# Patient Record
Sex: MALE | Race: BLACK OR AFRICAN AMERICAN
[De-identification: names, ages, dates, MRNs, and addresses within clinical notes are randomized per-mention and may not be internally consistent; named-entity substitution may affect disease eponyms.]

---

## 2018-03-31 ENCOUNTER — HOSPITAL ENCOUNTER (EMERGENCY)
Dept: HOSPITAL 63 - ER | Age: 28
Discharge: PSYCHIATRIC OR SUBSTANCE USE FACILITY | End: 2018-03-31
Payer: OTHER GOVERNMENT

## 2018-03-31 VITALS — HEIGHT: 73 IN | WEIGHT: 250 LBS | BODY MASS INDEX: 33.13 KG/M2

## 2018-03-31 VITALS — DIASTOLIC BLOOD PRESSURE: 91 MMHG | SYSTOLIC BLOOD PRESSURE: 136 MMHG

## 2018-03-31 DIAGNOSIS — Z88.5: ICD-10-CM

## 2018-03-31 DIAGNOSIS — R45.851: Primary | ICD-10-CM

## 2018-03-31 LAB
ALBUMIN SERPL-MCNC: 3.7 G/DL (ref 3.4–5)
ALP SERPL-CCNC: 80 U/L (ref 46–116)
ALT SERPL-CCNC: 31 U/L (ref 16–63)
AMPHETAMINE/METHAMPHETAMINE: (no result)
ANION GAP SERPL CALC-SCNC: 10 MMOL/L (ref 6–14)
AST SERPL-CCNC: 24 U/L (ref 15–37)
BARBITURATES UR-MCNC: (no result) UG/ML
BASOPHILS # BLD AUTO: 0 X10^3/UL (ref 0–0.2)
BASOPHILS NFR BLD: 0 % (ref 0–3)
BENZODIAZ UR-MCNC: (no result) UG/L
BILIRUB DIRECT SERPL-MCNC: 0.1 MG/DL (ref 0–0.2)
BILIRUB SERPL-MCNC: 0.5 MG/DL (ref 0.2–1)
CA-I SERPL ISE-MCNC: 7 MG/DL (ref 8–26)
CALCIUM SERPL-MCNC: 8.8 MG/DL (ref 8.5–10.1)
CANNABINOIDS UR-MCNC: (no result) UG/L
CHLORIDE SERPL-SCNC: 104 MMOL/L (ref 98–107)
CO2 SERPL-SCNC: 26 MMOL/L (ref 21–32)
COCAINE UR-MCNC: (no result) NG/ML
CREAT SERPL-MCNC: 1.1 MG/DL (ref 0.7–1.3)
EOSINOPHIL NFR BLD: 0.1 X10^3/UL (ref 0–0.7)
EOSINOPHIL NFR BLD: 2 % (ref 0–3)
ERYTHROCYTE [DISTWIDTH] IN BLOOD BY AUTOMATED COUNT: 13.7 % (ref 11.5–14.5)
GFR SERPLBLD BASED ON 1.73 SQ M-ARVRAT: 97.9 ML/MIN
GLUCOSE SERPL-MCNC: 92 MG/DL (ref 70–99)
HCT VFR BLD CALC: 47.4 % (ref 39–53)
HGB BLD-MCNC: 16 G/DL (ref 13–17.5)
LYMPHOCYTES # BLD: 1.4 X10^3/UL (ref 1–4.8)
LYMPHOCYTES NFR BLD AUTO: 40 % (ref 24–48)
MCH RBC QN AUTO: 29 PG (ref 25–35)
MCHC RBC AUTO-ENTMCNC: 34 G/DL (ref 31–37)
MCV RBC AUTO: 87 FL (ref 79–100)
METHADONE SERPL-MCNC: (no result) NG/ML
MONO #: 0.3 X10^3/UL (ref 0–1.1)
MONOCYTES NFR BLD: 7 % (ref 0–9)
NEUT #: 1.8 X10^3UL (ref 1.8–7.7)
NEUTROPHILS NFR BLD AUTO: 50 % (ref 31–73)
OPIATES UR-MCNC: (no result) NG/ML
PCP SERPL-MCNC: (no result) MG/DL
PLATELET # BLD AUTO: 250 X10^3/UL (ref 140–400)
POTASSIUM SERPL-SCNC: 4 MMOL/L (ref 3.5–5.1)
PROT SERPL-MCNC: 7.8 G/DL (ref 6.4–8.2)
RBC # BLD AUTO: 5.45 X10^6/UL (ref 4.3–5.7)
SODIUM SERPL-SCNC: 140 MMOL/L (ref 136–145)
WBC # BLD AUTO: 3.6 X10^3/UL (ref 4–11)

## 2018-03-31 PROCEDURE — 80048 BASIC METABOLIC PNL TOTAL CA: CPT

## 2018-03-31 PROCEDURE — 80307 DRUG TEST PRSMV CHEM ANLYZR: CPT

## 2018-03-31 PROCEDURE — 36415 COLL VENOUS BLD VENIPUNCTURE: CPT

## 2018-03-31 PROCEDURE — 80076 HEPATIC FUNCTION PANEL: CPT

## 2018-03-31 PROCEDURE — G0479 DRUG TEST PRESUMP NOT OPT: HCPCS

## 2018-03-31 PROCEDURE — 99285 EMERGENCY DEPT VISIT HI MDM: CPT

## 2018-03-31 PROCEDURE — 85025 COMPLETE CBC W/AUTO DIFF WBC: CPT

## 2018-03-31 NOTE — PHYS DOC
Past History


Past Medical History:  No Pertinent History


Alcohol Use:  Occasionally


Drug Use:  None





Adult General


Chief Complaint


Chief Complaint:  SUICDAL IDEATION





HPI


HPI





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 

with the complaint of suicidal ideation, mental health stressors. The patient 

tells me that this is been going on since 2010. He and his wife have been 

getting marital counseling and he also has been getting individual counseling. 

. He goes twice a month. He has not been on any medications. It has been 

helping somewhat. The patient states today he and his wife got into an argument

, he got very upset and left the house. He won't say what might of happened if 

he didn't leave the house. He sat in his car and made a comments that he was 

going to use a knife to hurt himself. I'm not clear if we made that comment to. 

His sergeant uninvolved and brought him to the ED.





Patient denies previous suicidal ideation or attempt.





Patient previously had surgery on his knee for cartilage injury, no other 

medical problems.





Review of Systems


Review of Systems





Constitutional: Denies fever or chills []


GI: Denies abdominal pain


: Denies dysuria or hematuria []


Musculoskeletal: Denies back pain





Allergies


Allergies





Allergies








Coded Allergies Type Severity Reaction Last Updated Verified


 


  codeine Allergy Unknown  3/31/18 Yes











Physical Exam


Physical Exam





Constitutional: Well developed, well nourished, no acute distress, non-toxic 

appearance. Alert, warm and dry, ambulatory, cooperative.


HENT: Normocephalic, atraumatic, bilateral external ears normal,  nose normal. [

]


Eyes:  conjunctiva normal, no discharge. [] 


Neck: Normal range of motion,  no stridor. [] 


Cardiovascular:Heart rate regular rhythm, no murmur []


Lungs & Thorax:  Bilateral breath sounds clear to auscultation []


Skin: Warm, dry, no erythema, no rash. [] 


Extremities: No tenderness, no cyanosis, no clubbing, ROM intact, no edema. [] 


Neurologic: Alert and oriented X 3, normal motor function,  no focal deficits 

noted. []





Current Patient Data


Vital Signs





 Vital Signs








  Date Time  Temp Pulse Resp B/P (MAP) Pulse Ox O2 Delivery O2 Flow Rate FiO2


 


3/31/18 12:28 98.3 82 16  99 Room Air  








Lab Results





 Laboratory Tests








Test


  3/31/18


12:46 3/31/18


14:30


 


White Blood Count


  3.6 x10^3/uL


(4.0-11.0)  L 


 


 


Red Blood Count


  5.45 x10^6/uL


(4.30-5.70) 


 


 


Hemoglobin


  16.0 g/dL


(13.0-17.5) 


 


 


Hematocrit


  47.4 %


(39.0-53.0) 


 


 


Mean Corpuscular Volume


  87 fL ()


  


 


 


Mean Corpuscular Hemoglobin 29 pg (25-35)   


 


Mean Corpuscular Hemoglobin


Concent 34 g/dL


(31-37) 


 


 


Red Cell Distribution Width


  13.7 %


(11.5-14.5) 


 


 


Platelet Count


  250 x10^3/uL


(140-400) 


 


 


Neutrophils (%) (Auto) 50 % (31-73)   


 


Lymphocytes (%) (Auto) 40 % (24-48)   


 


Monocytes (%) (Auto) 7 % (0-9)   


 


Eosinophils (%) (Auto) 2 % (0-3)   


 


Basophils (%) (Auto) 0 % (0-3)   


 


Neutrophils # (Auto)


  1.8 x10^3uL


(1.8-7.7) 


 


 


Lymphocytes # (Auto)


  1.4 x10^3/uL


(1.0-4.8) 


 


 


Monocytes # (Auto)


  0.3 x10^3/uL


(0.0-1.1) 


 


 


Eosinophils # (Auto)


  0.1 x10^3/uL


(0.0-0.7) 


 


 


Basophils # (Auto)


  0.0 x10^3/uL


(0.0-0.2) 


 


 


Sodium Level


  140 mmol/L


(136-145) 


 


 


Potassium Level


  4.0 mmol/L


(3.5-5.1) 


 


 


Chloride Level


  104 mmol/L


() 


 


 


Carbon Dioxide Level


  26 mmol/L


(21-32) 


 


 


Anion Gap 10 (6-14)   


 


Blood Urea Nitrogen


  7 mg/dL (8-26)


L 


 


 


Creatinine


  1.1 mg/dL


(0.7-1.3) 


 


 


Estimated GFR


(Cockcroft-Gault) 97.9  


  


 


 


Glucose Level


  92 mg/dL


(70-99) 


 


 


Calcium Level


  8.8 mg/dL


(8.5-10.1) 


 


 


Total Bilirubin


  0.5 mg/dL


(0.2-1.0) 


 


 


Direct Bilirubin


  0.1 mg/dL


(0.0-0.2) 


 


 


Aspartate Amino Transferase


(AST) 24 U/L (15-37)


  


 


 


Alanine Aminotransferase (ALT)


  31 U/L (16-63)


  


 


 


Alkaline Phosphatase


  80 U/L


() 


 


 


Total Protein


  7.8 g/dL


(6.4-8.2) 


 


 


Albumin


  3.7 g/dL


(3.4-5.0) 


 


 


Urine Opiates Screen  Neg (NEG)  


 


Urine Methadone Screen  Neg (NEG)  


 


Urine Barbiturates  Neg (NEG)  


 


Urine Phencyclidine Screen  Neg (NEG)  


 


Urine


Amphetamine/Methamphetamine 


  Neg (NEG)  


 


 


Urine Benzodiazepines Screen  Neg (NEG)  


 


Urine Cocaine Screen  Neg (NEG)  


 


Urine Cannabinoids Screen  Neg (NEG)  


 


Urine Ethyl Alcohol  Neg (NEG)  











EKG


EKG


[]





Radiology/Procedures


Radiology/Procedures


[]





Course & Med Decision Making


Course & Med Decision Making


Pertinent Labs and Imaging studies reviewed. (See chart for details)





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 

thoughts or thoughts to harm himself. Urine drug screen is negative and labs 

are normal. The patient is medically cleared for psychiatric evaluation.





ED RN made arrangements for psychiatric evaluation via telepsych. 

Recommendation was made for the patient to be inpatient.





ED RN is making phone calls to try to place the patient in an inpatient 

facility. The patient remains stable, resting in his room cooperatively, with 

his sergeant at his bedside.





I am checking out the patient to Dr. Cody at change of shift. ED RN is still 

trying to find the patient an inpatient mental health facility. Dr. Cody is 

aware that the patient needs to be hospitalized for suicidal ideation, with 

inpatient disposition recommended by psychiatric evaluation.





[]





Dragon Disclaimer


Dragon Disclaimer


This electronic medical record was generated, in whole or in part, using a 

voice recognition dictation system.





Departure


Departure:


Referrals:  


CORTES HERNANDES MD (PCP)











WARREN LEONARD MD Mar 31, 2018 16:40